# Patient Record
(demographics unavailable — no encounter records)

---

## 2025-03-04 NOTE — WORK
[Partial] : partial [Does not reveal pre-existing condition(s) that may affect treatment/prognosis] : does not reveal pre-existing condition(s) that may affect treatment/prognosis [N/A] : : Not Applicable [Patient] : patient [No Rx restrictions] : No Rx restrictions. [I provided the services listed above] :  I provided the services listed above. [FreeTextEntry1] : good-working

## 2025-03-04 NOTE — PHYSICAL EXAM
[Left] : left knee [5___] : hamstring 5[unfilled]/5 [Negative] : negative Misty's [] : no pain with varus stress [TWNoteComboBox7] : flexion 110 degrees [de-identified] : extension 0 degrees

## 2025-03-04 NOTE — HISTORY OF PRESENT ILLNESS
[3] : 3 [0] : 0 [Not working due to injury] : Work status: not working due to injury [] : yes [de-identified] : Here for f/u left knee. Good relief from Gel one on 8/4/24. Now having pain again. No new injury.

## 2025-03-04 NOTE — DISCUSSION/SUMMARY
[de-identified] : General Dx Discussion The patient was advised of the diagnosis. The natural history of the pathology was explained in full to the patient in layman's terms. All questions were answered. The risks and benefits of surgical and non-surgical treatment alternatives were explained in full to the patient.  Case Discussed. He did well with Gel One injection in the past therefore recommend and request authorization to repeat Gel One injections.  f/u once approved.   Entered by Jade EDMOND acting as scribe for Dr. Jaime Lewis MD

## 2025-03-25 NOTE — HISTORY OF PRESENT ILLNESS
[Work related] : work related [Sudden] : sudden [2] : 2 [Dull/Aching] : dull/aching [Localized] : localized [Work] : work [Injection therapy] : injection therapy [de-identified] : WC Here for f/u left knee.  [] : no [FreeTextEntry1] : left knee [FreeTextEntry3] : 9-5-14

## 2025-03-25 NOTE — PROCEDURE
[Large Joint Injection] : Large joint injection [Left] : of the left [Knee] : knee [Pain] : pain [Inflammation] : inflammation [X-ray evidence of Osteoarthritis on this or prior visit] : x-ray evidence of Osteoarthritis on this or prior visit [Repeat series performed] : repeat series performed [Alcohol] : alcohol [Betadine] : betadine [Ethyl Chloride sprayed topically] : ethyl chloride sprayed topically [Sterile technique used] : sterile technique used [Gel-One (30mg)] : 30mg of Gel-One [] : Patient tolerated procedure well [Call if redness, pain or fever occur] : call if redness, pain or fever occur [Apply ice for 15min out of every hour for the next 12-24 hours as tolerated] : apply ice for 15 minutes out of every hour for the next 12-24 hours as tolerated [Previous OTC use and PT nontherapeutic] : patient has tried OTC's including aspirin, Ibuprofen, Aleve, etc or prescription NSAIDS, and/or exercises at home and/or physical therapy without satisfactory response [Patient had decreased mobility in the joint] : patient had decreased mobility in the joint [Risks, benefits, alternatives discussed / Verbal consent obtained] : the risks benefits, and alternatives have been discussed, and verbal consent was obtained [Prior failure or difficult injection] : prior failure or difficult injection [Altered anatomic landmarks d/t erosive arthritis] : altered anatomic landmarks d/t erosive arthritis [All ultrasound images have been permanently captured and stored accordingly in our picture archiving and communication system] : All ultrasound images have been permanently captured and stored accordingly in our picture archiving and communication system

## 2025-03-25 NOTE — PHYSICAL EXAM
[Left] : left knee [5___] : hamstring 5[unfilled]/5 [Negative] : negative Misty's [] : patient ambulates without assistive device [TWNoteComboBox7] : flexion 110 degrees [de-identified] : extension 0 degrees

## 2025-03-25 NOTE — DISCUSSION/SUMMARY
[de-identified] : General Dx Discussion The patient was advised of the diagnosis. The natural history of the pathology was explained in full to the patient in layman's terms. All questions were answered. The risks and benefits of surgical and non-surgical treatment alternatives were explained in full to the patient.  Case Discussed. Gel One today tolerated well.  Ice as needed.  Entered by Jade EDMOND acting as scribe for Dr. Jaime Lewis MD

## 2025-07-09 NOTE — ASSESSMENT
[FreeTextEntry1] : _____ We discussed the underlying pathology. This is acute uncomplicated. Treatment options reviewed. I would not want to get too aggressive. We will try medication and physical therapy. Physical Therapy is prescribed, 2x/week for 4-6 weeks. Naproxen 500mg, q12h for 10-14 days is prescribed, with risk of GI symptoms reviewed. An injection is indicated if symptoms persist. Cautions discussed. Questions answered. Follow up in 6-8 weeks  Patient seen by Dr. Crescencio Cherry, who determined the assessment and plan. Veronique EASTMAN participated in the care of the patient, including the history and physical exam. .I, Abbie Thomas, am scribing for Dr. Crescencio Cherry in his presence for the chief complaint, physical exam, studies, assessment, and/or plan.

## 2025-07-09 NOTE — DATA REVIEWED
[FreeTextEntry1] : SHOULDER XR LEFT (AP/Outlet) taken and reviewed on 7/9/25: The clinically significant findings include some GT changes.  There is decent GH space.  DCR is noted.  SCAPULA XR LEFT (Axillary/Zanca) taken and reviewed on 7/9/25: The clinically significant findings include a Type I-II acromion with a small spur.

## 2025-07-09 NOTE — PHYSICAL EXAM
[Left] : left shoulder [Sitting] : sitting [Standing] : standing [Mild] : mild [5 ___] : forward flexion 5[unfilled]/5 [5___] : external rotation 5[unfilled]/5 [Right] : right shoulder [] : no sensory deficits [de-identified] : -arc [TWNoteComboBox4] : passive forward flexion 160 degrees [TWNoteComboBox6] : internal rotation L2 [de-identified] : external rotation 60 degrees

## 2025-07-09 NOTE — HISTORY OF PRESENT ILLNESS
[5] : 5 [1] : 2 [Dull/Aching] : dull/aching [Leisure] : leisure [Meds] : meds [Extending back] : extending back [Retired] : Work status: retired [] : no [FreeTextEntry1] : Left Shoulder [FreeTextEntry7] : Triceps [FreeTextEntry9] : Aleve [de-identified] : Lifting, extending to the side [de-identified] : Dr. Cherry [de-identified] :

## 2025-07-09 NOTE — REASON FOR VISIT
[FreeTextEntry2] : NEW CONDITION 07/09/2025 This is a 69 year old RHD retired male  with left shoulder pain that started without injury in June 2025.   There was no injury.  Reaching is uncomfortable.  He has taken Aleve.  Sleeping is uncomfortable.  There is no numbness.  He had a WC injury on 9/5/14 (a fall).  The left knee and both shoulders were affected.  On 2/2/2017, Dr. Cherry performed a Left Shoulder Arthroscopy, Synovectomy, Glenohumeral Debridement, Calcium Release, Small Rotator Cuff Repair, Distal Clavicle Resection.  He recovered decently well.  There was a 35% loss of use.  There was surgery on the right (1/22/2015) which included a Right Shoulder Arthroscopy, Glenohumeral  Debridement, Synovectomy, Subacromial Decompression, Small Rotator Cuff Repair, Distal Clavicle Resection, and MMI was reached.